# Patient Record
Sex: MALE | Race: AMERICAN INDIAN OR ALASKA NATIVE | Employment: OTHER | URBAN - METROPOLITAN AREA
[De-identification: names, ages, dates, MRNs, and addresses within clinical notes are randomized per-mention and may not be internally consistent; named-entity substitution may affect disease eponyms.]

---

## 2022-04-15 ENCOUNTER — HOSPITAL ENCOUNTER (EMERGENCY)
Age: 77
Discharge: HOME | End: 2022-04-15
Attending: EMERGENCY MEDICINE
Payer: COMMERCIAL

## 2022-04-15 ENCOUNTER — APPOINTMENT (OUTPATIENT)
Dept: CT IMAGING | Age: 77
End: 2022-04-15
Attending: EMERGENCY MEDICINE
Payer: COMMERCIAL

## 2022-04-15 VITALS
OXYGEN SATURATION: 95 % | SYSTOLIC BLOOD PRESSURE: 145 MMHG | DIASTOLIC BLOOD PRESSURE: 92 MMHG | HEIGHT: 71 IN | WEIGHT: 220 LBS | RESPIRATION RATE: 16 BRPM | HEART RATE: 83 BPM | BODY MASS INDEX: 30.8 KG/M2 | TEMPERATURE: 98 F

## 2022-04-15 DIAGNOSIS — D33.2 BENIGN NEOPLASM OF BRAIN, UNSPECIFIED BRAIN REGION (HCC): Primary | ICD-10-CM

## 2022-04-15 PROCEDURE — 99284 EMERGENCY DEPT VISIT MOD MDM: CPT

## 2022-04-15 PROCEDURE — 74011250637 HC RX REV CODE- 250/637: Performed by: EMERGENCY MEDICINE

## 2022-04-15 PROCEDURE — 74011250636 HC RX REV CODE- 250/636: Performed by: EMERGENCY MEDICINE

## 2022-04-15 PROCEDURE — 70450 CT HEAD/BRAIN W/O DYE: CPT

## 2022-04-15 RX ORDER — DEXAMETHASONE 4 MG/1
4 TABLET ORAL 3 TIMES DAILY
Qty: 15 TABLET | Refills: 0 | Status: SHIPPED | OUTPATIENT
Start: 2022-04-15 | End: 2022-04-20

## 2022-04-15 RX ORDER — DEXAMETHASONE 4 MG/1
4 TABLET ORAL ONCE
Status: COMPLETED | OUTPATIENT
Start: 2022-04-15 | End: 2022-04-15

## 2022-04-15 RX ORDER — ERYTHROMYCIN 5 MG/G
OINTMENT OPHTHALMIC
Status: COMPLETED | OUTPATIENT
Start: 2022-04-15 | End: 2022-04-15

## 2022-04-15 RX ORDER — ONDANSETRON 8 MG/1
8 TABLET, ORALLY DISINTEGRATING ORAL
Qty: 12 TABLET | Refills: 1 | Status: SHIPPED | OUTPATIENT
Start: 2022-04-15

## 2022-04-15 RX ORDER — BUTALBITAL, ACETAMINOPHEN AND CAFFEINE 300; 40; 50 MG/1; MG/1; MG/1
1-2 CAPSULE ORAL
Qty: 20 CAPSULE | Refills: 0 | Status: SHIPPED | OUTPATIENT
Start: 2022-04-15

## 2022-04-15 RX ADMIN — ERYTHROMYCIN: 5 OINTMENT OPHTHALMIC at 21:11

## 2022-04-15 RX ADMIN — DEXAMETHASONE 4 MG: 4 TABLET ORAL at 21:11

## 2022-04-15 NOTE — ED TRIAGE NOTES
Patient ambulatory to triage with mask in place. Patient reports he fell 3 weeks ago. Pt reports increased swelling and drainage.

## 2022-04-16 RX ORDER — ERYTHROMYCIN 5 MG/G
OINTMENT OPHTHALMIC
Qty: 3.5 G | Refills: 0 | Status: SHIPPED | OUTPATIENT
Start: 2022-04-16 | End: 2022-04-23

## 2022-04-16 NOTE — ED NOTES
I have reviewed discharge instructions with the patient and caregiver. The patient and caregiver verbalized understanding. Patient left ED via Discharge Method: ambulatory to Home with family. Opportunity for questions and clarification provided. Patient given 3 scripts. To continue your aftercare when you leave the hospital, you may receive an automated call from our care team to check in on how you are doing. This is a free service and part of our promise to provide the best care and service to meet your aftercare needs.  If you have questions, or wish to unsubscribe from this service please call [phone]. Thank you for Choosing our [address] [place] [address] Emergency Department.

## 2022-04-16 NOTE — ED PROVIDER NOTES
51-year-old gentleman presents to the ER for continuation of care from [facility]. Patient sustained a mechanical trip and fall towards the end of March of this year, roughly 3 weeks ago. He was carrying some firewood, tripped and fell, and struck his left periorbital area on something. Sustaining pain swelling and bleeding to the forehead and periorbital area. 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 Patient was in the hospital for roughly a week by his estimation he was apparently referred to neurosurgery in [facility] at the other end [facility]. He is now here in [place], [place]; ( where his son lives) who will be helping him navigate the healthcare nails associated with his condition. Patient was told he will ultimately lose the eye. They do have a CD with multiple scans on it. They are hoping there may be more proximate appropriate neurosurgical care and follow-up without having to go all the way to [place]. He has some headaches in the morning and was reportedly not sent home with any medicines not even dexamethasone. Has little nausea, no vomiting. No neurological deficits and basically feels okay. Has some drainage from the left eye they are worried about infection there. No past medical history on file. No past surgical history on file. No family history on file.     Social History     Socioeconomic History    Marital status:      Spouse name: Not on file    Number of children: Not on file    Years of education: Not on file    Highest education level: Not on file   Occupational History    Not on file   Tobacco Use    Smoking status: Not on file    Smokeless tobacco: Not on file   Substance and Sexual Activity    Alcohol use: Not on file    Drug use: Not on file    Sexual activity: Not on file   Other Topics Concern    Not on file   Social History Narrative    Not on file     Social Determinants of Health     Financial Resource Strain:     Difficulty of Paying Living Expenses: Not on file   Food Insecurity:     Worried About Running Out of Food in the Last Year: Not on file    Rajni of Food in the Last Year: Not on file   Transportation Needs:     Lack of Transportation (Medical): Not on file    Lack of Transportation (Non-Medical): Not on file   Physical Activity:     Days of Exercise per Week: Not on file    Minutes of Exercise per Session: Not on file   Stress:     Feeling of Stress : Not on file   Social Connections:     Frequency of Communication with Friends and Family: Not on file    Frequency of Social Gatherings with Friends and Family: Not on file    Attends Hoahaoism Services: Not on file    Active Member of [address] [place] [address] or Organizations: Not on file    Attends Club or Organization Meetings: Not on file    Marital Status: Not on file   Intimate Partner Violence:     Fear of Current or Ex-Partner: Not on file    Emotionally Abused: Not on file    Physically Abused: Not on file    Sexually Abused: Not on file   Housing Stability:     Unable to Pay for Housing in the Last Year: Not on file    Number of [place] in the Last Year: Not on file    Unstable Housing in the Last Year: Not on file         ALLERGIES: Patient has no known allergies. Review of Systems   Constitutional: Negative for chills and fever. HENT: Negative for rhinorrhea and sore throat. Eyes: Positive for pain and discharge. Negative for redness. Respiratory: Negative for cough and shortness of breath. Cardiovascular: Negative for chest pain and palpitations. Gastrointestinal: Positive for nausea. Negative for abdominal pain, diarrhea and vomiting.    Musculoskeletal: Negative for arthralgias and back pain.   Skin: Negative for rash. Neurological: Positive for headaches. Negative for dizziness, weakness and numbness. All other systems reviewed and are negative. Vitals:    04/15/22 1825 04/15/22 1911   BP: 138/75 (!) 145/92   Pulse: 83    Resp: 16    Temp: 98 °F (36.7 °C)    SpO2: 95% 95%   Weight: 99.8 kg (220 lb)    Height: 5' 11\" (1.803 m)             Physical Exam  Vitals and nursing note reviewed. Constitutional:       General: He is not in acute distress. Appearance: Normal appearance. He is well-developed. He is not ill-appearing, toxic-appearing or diaphoretic. HENT:      Head: Normocephalic and atraumatic. Right Ear: External ear normal.      Left Ear: External ear normal.   Eyes:      General:         Right eye: No discharge. Left eye: No discharge. Comments: Left eye with some conjunctival injection proptosis, palpebral swelling but they are separable to permit is cursory evaluation of the globe   Pulmonary:      Effort: Pulmonary effort is normal. No respiratory distress. Musculoskeletal:         General: Normal range of motion. Cervical back: Normal range of motion and neck supple. Skin:     General: Skin is warm and dry. Findings: No rash. Neurological:      General: No focal deficit present. Mental Status: He is alert and oriented to person, place, and time. Mental status is at baseline. Motor: No abnormal muscle tone. Comments: cni 2-12 grossly  Nl gait,  Nl speech     Psychiatric:         Mood and Affect: Mood normal.         Behavior: Behavior normal.          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 Seeking evaluation.   There is less midline shift on CT today than on MRI April 2. We will start Decadron, discussed briefly with Dr. Trev Alex from neurosurgery on call, will direct patient to local neurosurgeons to see if he can be cared for here locally versus keeping the distant consults in [place] next month. This concludes the \"medical decision making note\" part of this emergency department visit note. Amount and/or Complexity of Data Reviewed  Tests in the radiology section of CPT®: reviewed and ordered (CT HEAD WO CONT   Final Result        Large left anterior frontal probably extra-axial soft tissue mass with intracranial and extracranial components as above, including extending into and expanding the left frontal/anterior ethmoid sinus with multifocal osseous dehiscence and hyperostosis. Recommend comparison with reported outside MRI. Significant vasogenic edema and the left frontal lobe, external capsule, and genu of corpus callosum with mass effect and approximately 11 mm of rightward supratentorial midline shift. No acute intracranial hemorrhage. Soft tissue extension of tumor into the left superomedial extraconal orbit with mass effect, mild left proptosis and periorbital swelling.         )  Review and summarize past medical records: yes (MRI from [facility] dated April 2 of this year reveals     a large inferior left frontal dural based hypervascular extra-axial mass with extension into the left frontal and left ethmoid sinuses with associated bone destruction and extension into the superior medial left orbit.   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

## 2022-04-16 NOTE — DISCHARGE INSTRUCTIONS
Definitely keep your appointment in [place] in May as scheduled unless otherwise directed by one of our local neurosurgeons    Call the offices for Dr. Tucker Williamson and Marisol Huber Monday, I will have emailed them a fairly detailed note regarding what is going on.   They should be able to see you, look at your disc, my scan, and give a second opinion as to whether this can be handled locally, or in [place], or whether you do indeed need to go all the way to [facility] as needed for headache, Zofran as needed for nausea,    Use the erythromycin ointment 3 times a day as directed to the eye    Return to the ER if worse in any way